# Patient Record
Sex: FEMALE | Race: WHITE
[De-identification: names, ages, dates, MRNs, and addresses within clinical notes are randomized per-mention and may not be internally consistent; named-entity substitution may affect disease eponyms.]

---

## 2018-03-04 ENCOUNTER — HOSPITAL ENCOUNTER (EMERGENCY)
Dept: HOSPITAL 25 - ED | Age: 53
Discharge: HOME | End: 2018-03-04
Payer: COMMERCIAL

## 2018-03-04 VITALS — SYSTOLIC BLOOD PRESSURE: 103 MMHG | DIASTOLIC BLOOD PRESSURE: 59 MMHG

## 2018-03-04 DIAGNOSIS — R10.9: ICD-10-CM

## 2018-03-04 DIAGNOSIS — K62.89: Primary | ICD-10-CM

## 2018-03-04 DIAGNOSIS — R19.7: ICD-10-CM

## 2018-03-04 LAB
BASOPHILS # BLD AUTO: 0.1 10^3/UL (ref 0–0.2)
EOSINOPHIL # BLD AUTO: 0.6 10^3/UL (ref 0–0.6)
HCT VFR BLD AUTO: 37 % (ref 35–47)
HGB BLD-MCNC: 12.1 G/DL (ref 12–16)
INR PPP/BLD: 0.95 (ref 0.77–1.02)
LYMPHOCYTES # BLD AUTO: 1.7 10^3/UL (ref 1–4.8)
MCH RBC QN AUTO: 30 PG (ref 27–31)
MCHC RBC AUTO-ENTMCNC: 33 G/DL (ref 31–36)
MCV RBC AUTO: 92 FL (ref 80–97)
MONOCYTES # BLD AUTO: 1.3 10^3/UL (ref 0–0.8)
NEUTROPHILS # BLD AUTO: 12 10^3/UL (ref 1.5–7.7)
NRBC # BLD AUTO: 0 10^3/UL
NRBC BLD QL AUTO: 0
PLATELET # BLD AUTO: 478 10^3/UL (ref 150–450)
RBC # BLD AUTO: 4.03 10^6/UL (ref 4–5.4)
WBC # BLD AUTO: 15.6 10^3/UL (ref 3.5–10.8)

## 2018-03-04 PROCEDURE — 82150 ASSAY OF AMYLASE: CPT

## 2018-03-04 PROCEDURE — 83605 ASSAY OF LACTIC ACID: CPT

## 2018-03-04 PROCEDURE — 93005 ELECTROCARDIOGRAM TRACING: CPT

## 2018-03-04 PROCEDURE — 82272 OCCULT BLD FECES 1-3 TESTS: CPT

## 2018-03-04 PROCEDURE — 83735 ASSAY OF MAGNESIUM: CPT

## 2018-03-04 PROCEDURE — 99282 EMERGENCY DEPT VISIT SF MDM: CPT

## 2018-03-04 PROCEDURE — 85610 PROTHROMBIN TIME: CPT

## 2018-03-04 PROCEDURE — 82550 ASSAY OF CK (CPK): CPT

## 2018-03-04 PROCEDURE — 85025 COMPLETE CBC W/AUTO DIFF WBC: CPT

## 2018-03-04 PROCEDURE — 36415 COLL VENOUS BLD VENIPUNCTURE: CPT

## 2018-03-04 PROCEDURE — 83690 ASSAY OF LIPASE: CPT

## 2018-03-04 PROCEDURE — 86140 C-REACTIVE PROTEIN: CPT

## 2018-03-04 PROCEDURE — 80053 COMPREHEN METABOLIC PANEL: CPT

## 2018-03-04 PROCEDURE — 81003 URINALYSIS AUTO W/O SCOPE: CPT

## 2018-03-04 NOTE — ED
Randall MAURICE Natalie, scribed for Jocelyn Salgado MD on 03/04/18 at 1454 .





GI/ HPI





- HPI Summary


HPI Summary: 


The patient is a 51 y/o F presenting to the ED accompanied by  c/o blood 

in stool, diarrhea, and diffuse abd pain since 02/15/18. The pain is rated 8/

10. The pain is alleviated by lying down. The patient and her  took a 

trip to [facility] from 02/06/18-02/16/18, when the pt woke up with runny, liquid

, brown, diarrhea starting on 02/15/18. Since shes been home, the diarrhea has 

had an orange color. She normally takes Mesalamine 2x daily for hx of 

ulcerative colitis as prescribed by her PCP Dr. Patino. After a week of the 

diarrhea not getting better, she started taking the Mesalamine 4x daily as 

recommended by Dr. Patino. She started taking a depository of Mesalamine, to no 

relief. Then she took Pepto-Bismol which made the abd cramping worse. She hasn

t taken anything PTA for pain, including antibiotics. Results from Dr. Patino 02/28 /18 show stool specimens collected were all negative with unremarkable results. 

She has FHx of Crohns disease in mother.





- History of Current Complaint


Chief Complaint: EDAbdPain


Time Seen by Provider: 03/04/18 13:36


Stated Complaint: DIAHREA


Hx Obtained From: Patient


Onset/Duration: Started Weeks Ago - starting 02/15/18, Still Present


Timing: Constant


Severity: Moderate


Current Severity: Moderate


Pain Intensity: 9


Location of Pain: Diffuse


Pain Characteristics: Cramping


Associated Signs and Symptoms: Positive: Blood w/Stool, Diarrhea, Abdominal Pain





- Allergy/Home Medications


Allergies/Adverse Reactions: 


 Allergies











Allergy/AdvReac Type Severity Reaction Status Date / Time


 


No Known Allergies Allergy   Verified 03/04/18 13:17














PMH/Surg Hx/FS Hx/Imm Hx


Previously Healthy: No


Endocrine/Hematology History: 


   Denies: Hx Diabetes


Cardiovascular History: 


   Denies: Hx Hypertension, Hx Pacemaker/ICD


Respiratory History: 


   Denies: Hx Asthma


 History: 


   Denies: Hx Renal Disease


Sensory History: 


   Denies: Hx Hearing Aid


Psychiatric History: 


   Denies: Hx Panic Disorder





- Cancer History


Hx Chemotherapy: No


Hx Radiation Therapy: No





- Surgical History


Surgery Procedure, Year, and Place: WISDOM TEETH, BREAST BIOPSY CLIP


Infectious Disease History: No


Infectious Disease History: 


   Denies: Traveled Outside the [place] in Last 30 Days





- Family History


Known Family History: Positive: Other - Crohn's disease in mother





- Social History


Alcohol Use: Occasionally


Substance Use Type: Reports: None


Smoking Status (MU): Never Smoked Tobacco





Review of Systems


Positive: Abdominal Pain, Diarrhea - orange, Other - blood in stool


Negative: dysuria


All Other Systems Reviewed And Are Negative: Yes





Physical Exam





- Summary


Physical Exam Summary: 


Appearance: Ill-appearing, moderate pain distress, Well-nourished


Skin: Warm, color reflects adequate perfusion


Head: Normal Head/Face inspection


Eyes: Conjunctiva clear


ENT: Normal inspection


Neck: Supple, no nodes, no JVD.


Respiratory: Lungs clear, Normal breath sounds, no respiratory distress


Cardio: RRR, No murmur, pulses normal, brisk capillary refill


Abdomen: soft, Mostly LLQ tenderness without guarding or rebound, no masses


Bowel sounds: present 


Rectal exam: Showed negative for fissure, negative for fistulas, no hemorrhoids

, no bleeding externally, nontender, minimal stool in vault, stool sent for 

guaiac testing which was negative


Musculoskeletal: Strength Intact/ ROM intact. No calf tenderness. No edema.


Neuro: Alert, muscle tone normal, facial symmetry, speech normal, sensory/motor 

intact 


Psychological: Normal


Triage Information Reviewed: Yes


Vital Signs On Initial Exam: 


 Initial Vitals











Temp Pulse Resp BP Pulse Ox


 


 100.5 F   90   16   140/75   98 


 


 03/04/18 13:13  03/04/18 13:13  03/04/18 13:13  03/04/18 13:13  03/04/18 13:13











Vital Signs Reviewed: Yes





Diagnostics





- Vital Signs


 Vital Signs











  Temp Pulse Resp BP Pulse Ox


 


 03/04/18 13:25   99    98


 


 03/04/18 13:23     117/75 


 


 03/04/18 13:13  100.5 F  90  16  140/75  98














- Laboratory


Lab Results: 


 Lab Results











  03/04/18 03/04/18 03/04/18 Range/Units





  13:53 13:53 13:53 


 


WBC   15.6 H   (3.5-10.8)  10^3/ul


 


RBC   4.03   (4.0-5.4)  10^6/ul


 


Hgb   12.1   (12.0-16.0)  g/dl


 


Hct   37   (35-47)  %


 


MCV   92   (80-97)  fL


 


MCH   30   (27-31)  pg


 


MCHC   33   (31-36)  g/dl


 


RDW   13   (10.5-15)  %


 


Plt Count   478 H   (150-450)  10^3/ul


 


MPV   7 L   (7.4-10.4)  um3


 


Neut % (Auto)   76.9   (38-83)  %


 


Lymph % (Auto)   10.7 L   (25-47)  %


 


Mono % (Auto)   8.1 H   (0-7)  %


 


Eos % (Auto)   4.0   (0-6)  %


 


Baso % (Auto)   0.3   (0-2)  %


 


Absolute Neuts (auto)   12.0 H   (1.5-7.7)  10^3/ul


 


Absolute Lymphs (auto)   1.7   (1.0-4.8)  10^3/ul


 


Absolute Monos (auto)   1.3 H   (0-0.8)  10^3/ul


 


Absolute Eos (auto)   0.6   (0-0.6)  10^3/ul


 


Absolute Basos (auto)   0.1   (0-0.2)  10^3/ul


 


Absolute Nucleated RBC   0   10^3/ul


 


Nucleated RBC %   0   


 


INR (Anticoag Therapy)     (0.77-1.02)  


 


Sodium  136    (133-145)  mmol/L


 


Potassium  3.6    (3.5-5.0)  mmol/L


 


Chloride  103    (101-111)  mmol/L


 


Carbon Dioxide  25    (22-32)  mmol/L


 


Anion Gap  8    (2-11)  mmol/L


 


BUN  6    (6-24)  mg/dL


 


Creatinine  0.63    (0.51-0.95)  mg/dL


 


Est GFR ( Amer)  127.1    (>60)  


 


Est GFR (Non-Af Amer)  98.8    (>60)  


 


BUN/Creatinine Ratio  9.5    (8-20)  


 


Glucose  119 H    ()  mg/dL


 


Lactic Acid    1.4  (0.5-2.0)  mmol/L


 


Calcium  9.6    (8.6-10.3)  mg/dL


 


Magnesium  2.0    (1.9-2.7)  mg/dL


 


Total Bilirubin  0.30    (0.2-1.0)  mg/dL


 


AST  13    (13-39)  U/L


 


ALT  20    (7-52)  U/L


 


Alkaline Phosphatase  82    ()  U/L


 


Total Creatine Kinase  14    ()  U/L


 


C-Reactive Protein  89.67 H    (< 5.00)  mg/L


 


Total Protein  7.2    (6.4-8.9)  g/dL


 


Albumin  3.8    (3.2-5.2)  g/dL


 


Globulin  3.4    (2-4)  g/dL


 


Albumin/Globulin Ratio  1.1    (1-3)  


 


Amylase  34    ()  U/L


 


Lipase  11    (11.0-82.0)  U/L


 


Urine Color     


 


Urine Appearance     


 


Urine pH     (5-9)  


 


Ur Specific Gravity     (1.010-1.030)  


 


Urine Protein     (Negative)  


 


Urine Ketones     (Negative)  


 


Urine Blood     (Negative)  


 


Urine Nitrate     (Negative)  


 


Urine Bilirubin     (Negative)  


 


Urine Urobilinogen     (Negative)  


 


Ur Leukocyte Esterase     (Negative)  


 


Urine Glucose     (Negative)  














  03/04/18 03/04/18 Range/Units





  13:53 13:53 


 


WBC    (3.5-10.8)  10^3/ul


 


RBC    (4.0-5.4)  10^6/ul


 


Hgb    (12.0-16.0)  g/dl


 


Hct    (35-47)  %


 


MCV    (80-97)  fL


 


MCH    (27-31)  pg


 


MCHC    (31-36)  g/dl


 


RDW    (10.5-15)  %


 


Plt Count    (150-450)  10^3/ul


 


MPV    (7.4-10.4)  um3


 


Neut % (Auto)    (38-83)  %


 


Lymph % (Auto)    (25-47)  %


 


Mono % (Auto)    (0-7)  %


 


Eos % (Auto)    (0-6)  %


 


Baso % (Auto)    (0-2)  %


 


Absolute Neuts (auto)    (1.5-7.7)  10^3/ul


 


Absolute Lymphs (auto)    (1.0-4.8)  10^3/ul


 


Absolute Monos (auto)    (0-0.8)  10^3/ul


 


Absolute Eos (auto)    (0-0.6)  10^3/ul


 


Absolute Basos (auto)    (0-0.2)  10^3/ul


 


Absolute Nucleated RBC    10^3/ul


 


Nucleated RBC %    


 


INR (Anticoag Therapy)   0.95  (0.77-1.02)  


 


Sodium    (133-145)  mmol/L


 


Potassium    (3.5-5.0)  mmol/L


 


Chloride    (101-111)  mmol/L


 


Carbon Dioxide    (22-32)  mmol/L


 


Anion Gap    (2-11)  mmol/L


 


BUN    (6-24)  mg/dL


 


Creatinine    (0.51-0.95)  mg/dL


 


Est GFR ( Amer)    (>60)  


 


Est GFR (Non-Af Amer)    (>60)  


 


BUN/Creatinine Ratio    (8-20)  


 


Glucose    ()  mg/dL


 


Lactic Acid    (0.5-2.0)  mmol/L


 


Calcium    (8.6-10.3)  mg/dL


 


Magnesium    (1.9-2.7)  mg/dL


 


Total Bilirubin    (0.2-1.0)  mg/dL


 


AST    (13-39)  U/L


 


ALT    (7-52)  U/L


 


Alkaline Phosphatase    ()  U/L


 


Total Creatine Kinase    ()  U/L


 


C-Reactive Protein    (< 5.00)  mg/L


 


Total Protein    (6.4-8.9)  g/dL


 


Albumin    (3.2-5.2)  g/dL


 


Globulin    (2-4)  g/dL


 


Albumin/Globulin Ratio    (1-3)  


 


Amylase    ()  U/L


 


Lipase    (11.0-82.0)  U/L


 


Urine Color  Straw   


 


Urine Appearance  Clear   


 


Urine pH  6.0   (5-9)  


 


Ur Specific Gravity  1.003 L   (1.010-1.030)  


 


Urine Protein  Negative   (Negative)  


 


Urine Ketones  Negative   (Negative)  


 


Urine Blood  Negative   (Negative)  


 


Urine Nitrate  Negative   (Negative)  


 


Urine Bilirubin  Negative   (Negative)  


 


Urine Urobilinogen  Negative   (Negative)  


 


Ur Leukocyte Esterase  Negative   (Negative)  


 


Urine Glucose  Negative   (Negative)  











Result Diagrams: 


 03/04/18 13:53





 03/04/18 13:53


Lab Statement: Any lab studies that have been ordered have been reviewed, and 

results considered in the medical decision making process.





- EKG


  ** 13:58


Cardiac Rate: NL


EKG Rhythm: Sinus Rhythm - 91 BPM


ST Segment: Non-Specific


Ectopy: None


EKG Interpretation: Nml AVIVCT. Nml QTc. Nml axis.





Re-Evaluation





- Re-Evaluation


  ** First Eval


Re-Evaluation Time: 15:42


Change: Improved


Comment: The patient's pain is controlled. She is agreeable with being 

discharged home.





GIGU Course/Dx





- Course


Course Of Treatment: Pt's medications reviewed. In the ER, the patient was 

given NS IV, Zofran, and Toradol. I spoke with Dr. Castro concerning the patient

's care, who spoke with Dr. Burciaga, who suggested the patient is having a 

flare up of proctitis. The patient is diagnosed with proctitis and acute 

diarrhea. She will be discharged home with prescription for coritsone retention 

enema. The patient is advised to follow up with PCP Dr. Patino.





- Diagnoses


Provider Diagnoses: 


 Proctitis, Acute diarrhea








- Physician Notifications


Discussed Care Of Patient With: Vlad Castro


Time Discussed With Above Provider: 14:49 - I talked to Dr. Castro, who talked 

to the gastroentologist on call, Dr. Burciaga. They think that this is a 

flare up of the patient's proctitis.





Discharge





- Discharge Plan


Condition: Stable


Disposition: HOME


Patient Education Materials:  Proctitis (ED), Acute Diarrhea (ED)


Referrals: 


Ana Roche MD [Primary Care Provider] - 


Marleni Patino MD [Medical Doctor] -  (keep your scheduled appointment )


Additional Instructions: 


We have spoken to Dr. Castro on call, who spoke with Dr. Burciaga, 

gastroenterologist on call.  They believe this is probably a flare of the 

proctitis.  They recommend a cortisone retention enema.  Dr. Castro is 

prescribing this now, and sending it to [facility] in [place].  You should 

instill this enema at bedtime, then lie on your left side for several minutes, 

holding in the contents of the enema as long as possible.  Then you may lie on 

your back and expel the enema after holding it in as long as possible.


We have given you a copy of your labs, and the supplies to collect another 

stool specimen.


Continue your mesalamine as directed.


Return to the ER if you have new or worsening symptoms.  





The documentation as recorded by the Randall baxter Natalie accurately reflects 

the service I personally performed and the decisions made by me, Jocelyn Salgado MD.